# Patient Record
Sex: FEMALE | Race: WHITE | NOT HISPANIC OR LATINO | Employment: STUDENT | URBAN - METROPOLITAN AREA
[De-identification: names, ages, dates, MRNs, and addresses within clinical notes are randomized per-mention and may not be internally consistent; named-entity substitution may affect disease eponyms.]

---
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
## 2025-01-15 ENCOUNTER — LAB (OUTPATIENT)
Dept: LAB | Facility: HOSPITAL | Age: 20
End: 2025-01-15
Payer: COMMERCIAL

## 2025-01-15 ENCOUNTER — OFFICE VISIT (OUTPATIENT)
Dept: GASTROENTEROLOGY | Facility: CLINIC | Age: 20
End: 2025-01-15
Payer: COMMERCIAL

## 2025-01-15 ENCOUNTER — TELEPHONE (OUTPATIENT)
Dept: GASTROENTEROLOGY | Facility: CLINIC | Age: 20
End: 2025-01-15

## 2025-01-15 VITALS
HEART RATE: 94 BPM | WEIGHT: 123 LBS | SYSTOLIC BLOOD PRESSURE: 110 MMHG | BODY MASS INDEX: 22.63 KG/M2 | OXYGEN SATURATION: 100 % | HEIGHT: 62 IN | TEMPERATURE: 98.2 F | DIASTOLIC BLOOD PRESSURE: 70 MMHG

## 2025-01-15 DIAGNOSIS — R14.0 ABDOMINAL BLOATING: ICD-10-CM

## 2025-01-15 DIAGNOSIS — K58.1 IRRITABLE BOWEL SYNDROME WITH CONSTIPATION: ICD-10-CM

## 2025-01-15 DIAGNOSIS — K58.1 IRRITABLE BOWEL SYNDROME WITH CONSTIPATION: Primary | ICD-10-CM

## 2025-01-15 LAB
25(OH)D3 SERPL-MCNC: 26.1 NG/ML (ref 30–100)
ALBUMIN SERPL-MCNC: 4.2 G/DL (ref 3.5–5.2)
ALBUMIN/GLOB SERPL: 1.3 G/DL
ALP SERPL-CCNC: 69 U/L (ref 39–117)
ALT SERPL W P-5'-P-CCNC: 8 U/L (ref 1–33)
ANION GAP SERPL CALCULATED.3IONS-SCNC: 8.2 MMOL/L (ref 5–15)
AST SERPL-CCNC: 19 U/L (ref 1–32)
BASOPHILS # BLD AUTO: 0.03 10*3/MM3 (ref 0–0.2)
BASOPHILS NFR BLD AUTO: 0.7 % (ref 0–1.5)
BILIRUB SERPL-MCNC: 0.4 MG/DL (ref 0–1.2)
BUN SERPL-MCNC: 11 MG/DL (ref 6–20)
BUN/CREAT SERPL: 12.6 (ref 7–25)
CALCIUM SPEC-SCNC: 9.5 MG/DL (ref 8.6–10.5)
CHLORIDE SERPL-SCNC: 105 MMOL/L (ref 98–107)
CO2 SERPL-SCNC: 22.8 MMOL/L (ref 22–29)
CREAT SERPL-MCNC: 0.87 MG/DL (ref 0.57–1)
CRP SERPL-MCNC: <0.3 MG/DL (ref 0–0.5)
DEPRECATED RDW RBC AUTO: 34.3 FL (ref 37–54)
EGFRCR SERPLBLD CKD-EPI 2021: 98 ML/MIN/1.73
EOSINOPHIL # BLD AUTO: 0.04 10*3/MM3 (ref 0–0.4)
EOSINOPHIL NFR BLD AUTO: 0.9 % (ref 0.3–6.2)
ERYTHROCYTE [DISTWIDTH] IN BLOOD BY AUTOMATED COUNT: 11.9 % (ref 12.3–15.4)
GLOBULIN UR ELPH-MCNC: 3.2 GM/DL
GLUCOSE SERPL-MCNC: 87 MG/DL (ref 65–99)
HCT VFR BLD AUTO: 39.7 % (ref 34–46.6)
HGB BLD-MCNC: 13.1 G/DL (ref 12–15.9)
IMM GRANULOCYTES # BLD AUTO: 0.01 10*3/MM3 (ref 0–0.05)
IMM GRANULOCYTES NFR BLD AUTO: 0.2 % (ref 0–0.5)
LYMPHOCYTES # BLD AUTO: 1.8 10*3/MM3 (ref 0.7–3.1)
LYMPHOCYTES NFR BLD AUTO: 39.8 % (ref 19.6–45.3)
MAGNESIUM SERPL-MCNC: 2.1 MG/DL (ref 1.7–2.2)
MCH RBC QN AUTO: 26.4 PG (ref 26.6–33)
MCHC RBC AUTO-ENTMCNC: 33 G/DL (ref 31.5–35.7)
MCV RBC AUTO: 79.9 FL (ref 79–97)
MONOCYTES # BLD AUTO: 0.38 10*3/MM3 (ref 0.1–0.9)
MONOCYTES NFR BLD AUTO: 8.4 % (ref 5–12)
NEUTROPHILS NFR BLD AUTO: 2.26 10*3/MM3 (ref 1.7–7)
NEUTROPHILS NFR BLD AUTO: 50 % (ref 42.7–76)
NRBC BLD AUTO-RTO: 0 /100 WBC (ref 0–0.2)
PLATELET # BLD AUTO: 234 10*3/MM3 (ref 140–450)
PMV BLD AUTO: 11.2 FL (ref 6–12)
POTASSIUM SERPL-SCNC: 4.4 MMOL/L (ref 3.5–5.2)
PROT SERPL-MCNC: 7.4 G/DL (ref 6–8.5)
RBC # BLD AUTO: 4.97 10*6/MM3 (ref 3.77–5.28)
SODIUM SERPL-SCNC: 136 MMOL/L (ref 136–145)
TSH SERPL DL<=0.05 MIU/L-ACNC: 0.95 UIU/ML (ref 0.27–4.2)
WBC NRBC COR # BLD AUTO: 4.52 10*3/MM3 (ref 3.4–10.8)

## 2025-01-15 PROCEDURE — 82784 ASSAY IGA/IGD/IGG/IGM EACH: CPT

## 2025-01-15 PROCEDURE — 99204 OFFICE O/P NEW MOD 45 MIN: CPT | Performed by: PHYSICIAN ASSISTANT

## 2025-01-15 PROCEDURE — 86003 ALLG SPEC IGE CRUDE XTRC EA: CPT

## 2025-01-15 PROCEDURE — 86231 EMA EACH IG CLASS: CPT

## 2025-01-15 PROCEDURE — 86140 C-REACTIVE PROTEIN: CPT

## 2025-01-15 PROCEDURE — 82785 ASSAY OF IGE: CPT

## 2025-01-15 PROCEDURE — 86258 DGP ANTIBODY EACH IG CLASS: CPT

## 2025-01-15 PROCEDURE — 83735 ASSAY OF MAGNESIUM: CPT | Performed by: PHYSICIAN ASSISTANT

## 2025-01-15 PROCEDURE — 86008 ALLG SPEC IGE RECOMB EA: CPT

## 2025-01-15 PROCEDURE — 80050 GENERAL HEALTH PANEL: CPT

## 2025-01-15 PROCEDURE — 82306 VITAMIN D 25 HYDROXY: CPT | Performed by: PHYSICIAN ASSISTANT

## 2025-01-15 PROCEDURE — 86364 TISS TRNSGLTMNASE EA IG CLAS: CPT

## 2025-01-15 PROCEDURE — 36415 COLL VENOUS BLD VENIPUNCTURE: CPT

## 2025-01-15 RX ORDER — NORELGESTROMIN AND ETHINYL ESTRADIOL 35; 150 UG/MG; UG/MG
PATCH TRANSDERMAL
COMMUNITY
Start: 2024-12-06

## 2025-01-15 RX ORDER — PREDNISONE 10 MG/1
TABLET ORAL
COMMUNITY
Start: 2024-09-30 | End: 2025-01-15

## 2025-01-15 RX ORDER — BUSPIRONE HYDROCHLORIDE 5 MG/1
TABLET ORAL
COMMUNITY
Start: 2024-10-21

## 2025-01-15 RX ORDER — POLYETHYLENE GLYCOL 3350 17 G/17G
17 POWDER, FOR SOLUTION ORAL
COMMUNITY
End: 2025-01-15

## 2025-01-15 RX ORDER — ATOGEPANT 60 MG/1
TABLET ORAL
COMMUNITY
Start: 2024-10-30 | End: 2025-01-15

## 2025-01-15 RX ORDER — CLINDAMYCIN PHOSPHATE 11.9 MG/ML
SOLUTION TOPICAL
COMMUNITY
Start: 2024-12-23

## 2025-01-15 RX ORDER — EPINEPHRINE 0.3 MG/.3ML
INJECTION SUBCUTANEOUS
COMMUNITY

## 2025-01-15 NOTE — PROGRESS NOTES
New Patient Consultation     Patient Name: Monet Irvin  : 2005   MRN: [id number]     Chief Complaint:    Chief Complaint   Patient presents with    new patient     bloating    Constipation       History of Present Illness: Monet Irvin is a 19 y.o. female who is here today for a Gastroenterology Consultation for constipation.    Pt notes long term issues with constipation, bloating. She has previously tried and failed magnesium citrate, Miralax, stool softeners, enemas, Trulance, Linzess, bowel preps. She is currently taking Linzess 290mcg sporadically and having a small, incomplete BM about 1-2 times per week. She reports adequate oral hydration, fiber intake. She has eliminated dairy and gluten from her diet in the past with minimal relief.    Pt was previously seen by Dr. Wilber Aranda in [place], data deficient at time of exam.     Patient denies associated fever, chills, indigestion, vomiting, diarrhea, hematemesis, dysphagia, hematochezia, melena, weight loss or gain, dysuria, jaundice or bruising.    Patient denies personal or FHx of PUD, H Pylori, gastritis, pancreatitis, colitis, Celiac disease, UC, Crohn's disease, colon or gastric cancers. Pt denies tobacco, illicit substance or NSAID use. No previous abdominal surgeries. Rare EtOH use.     EGD 2022 with Dr. Angelia Oconnor at . Bx negative for H Pylori, celiac disease, EoE, intestinal metaplasia or dysplasia.     No previous CSY.     Subjective      Review of Systems:   Review of Systems   Constitutional:  Negative for appetite change, chills, diaphoresis, fatigue, fever, unexpected weight gain and unexpected weight loss.   HENT:  Negative for drooling, facial swelling, mouth sores, rhinorrhea, sore throat, tinnitus, trouble swallowing and voice change.    Eyes: Negative.    Respiratory:  Negative for cough, chest tightness and shortness of breath.    Cardiovascular:  Negative for chest pain.   Gastrointestinal:  Positive for  abdominal distention and constipation. Negative for blood in stool, diarrhea, vomiting, GERD and indigestion.   Genitourinary:  Negative for dysuria, flank pain, hematuria and pelvic pain.   Musculoskeletal:  Negative for arthralgias and myalgias.   Skin:  Negative for color change, pallor and rash.   Neurological:  Negative for dizziness, tremors, syncope, weakness and numbness.   Psychiatric/Behavioral:  Negative for hallucinations and sleep disturbance. The patient is not nervous/anxious.    All other systems reviewed and are negative.      Past Medical History: No past medical history on file.    Past Surgical History: No past surgical history on file.    Family History: No family history on file.    Social History:   Social History     Socioeconomic History    Marital status: Single       Alcohol/Tobacco History:   Social History     Substance and Sexual Activity   Alcohol Use Not on file     Social History     Tobacco Use   Smoking Status Not on file   Smokeless Tobacco Not on file       Medications:     Current Outpatient Medications:     busPIRone (BUSPAR) 5 MG tablet, TAKE 1 TABLET 2-3 TIMES DAILY AS NEEDED, Disp: , Rfl:     clindamycin (CLEOCIN T) 1 % external solution, 1(ONE) APPLICATION(S) TOPICAL 2(TWO) TIMES A DAY, Disp: , Rfl:     norelgestromin-ethinyl estradiol (ORTHO EVRA) 150-35 MCG/24HR, Apply 1 patch each week for 3 weeks, then remove for 1 week., Disp: , Rfl:     predniSONE (DELTASONE) 10 MG (21) dose pack, TAKE 6 TABLETS ON DAY 1 AS DIRECTED ON PACKAGE AND DECREASE BY 1 TAB EACH DAY FOR A TOTAL OF 6 DAYS, Disp: , Rfl:     Qulipta 60 MG tablet, , Disp: , Rfl:     diclofenac (VOLTAREN) 75 MG EC tablet, Take 1 tablet by mouth 2 (Two) Times a Day., Disp: 14 tablet, Rfl: 0    ondansetron ODT (ZOFRAN-ODT) 4 MG disintegrating tablet, Place 1 tablet on the tongue Every 6 (Six) Hours As Needed for Nausea or Vomiting., Disp: 15 tablet, Rfl: 0    polyethylene glycol (MIRALAX) 17 GM/SCOOP powder, Take 17  g by mouth., Disp: , Rfl:     Allergies:   No Known Allergies    Objective     Physical Exam:  Vital Signs: There were no vitals filed for this visit.  There is no height or weight on file to calculate BMI.     Physical Exam  Vitals and nursing note reviewed.   Constitutional:       Appearance: Normal appearance. She is normal weight. She is not ill-appearing or diaphoretic.      Comments: BMI 22.50. Pleasantly conversant.    HENT:      Head: Normocephalic and atraumatic.      Right Ear: External ear normal.      Left Ear: External ear normal.      Nose: Nose normal.      Mouth/Throat:      Mouth: Mucous membranes are moist.      Pharynx: Oropharynx is clear.   Eyes:      Conjunctiva/sclera: Conjunctivae normal.      Pupils: Pupils are equal, round, and reactive to light.   Neck:      Thyroid: No thyromegaly.   Cardiovascular:      Rate and Rhythm: Normal rate and regular rhythm.      Pulses: Normal pulses.      Heart sounds: Normal heart sounds.   Pulmonary:      Effort: Pulmonary effort is normal.      Breath sounds: Normal breath sounds.   Abdominal:      General: Abdomen is flat. Bowel sounds are normal. There is distension.      Tenderness: There is no abdominal tenderness.      Comments: Increased stool burden throughout the colon   Musculoskeletal:         General: Normal range of motion.      Cervical back: Normal range of motion and neck supple.      Right lower leg: No edema.      Left lower leg: No edema.   Skin:     General: Skin is warm and dry.   Neurological:      General: No focal deficit present.      Mental Status: She is oriented to person, place, and time.   Psychiatric:         Mood and Affect: Mood normal.         Assessment / Plan      Assessment/Plan:   There are no diagnoses linked to this encounter.     IBS-C  Bloating   - rx for Golytely sent to pharmacy, pt advised to consume the 4L over the course of 3-4 days   - pt afforded samples of IBSrela, advised to call clinic in 1-2 weeks after  completing bowel prep / samples with sx update   - obtain previous records from previous GI provider   - obtain CBC, CMP, CRP, celiac panel, food allergy profile, alpha gal panel, Mg, TSH, vit D, C13 breath test   - schedule for CSY once bowel regimen is better established   - follow up in clinic after completion of above studies   - call clinic at any time for questions or new / worsened sx    Follow Up:   Return for Next scheduled follow up.    Plan of care reviewed with the patient at the conclusion of today's visit.  Education was provided regarding diagnosis, management, and any prescribed or recommended OTC medications.  Patient verbalized understanding of and agreement with management plan.     NOTE TO PATIENT: The 21st Century Cures Act makes medical notes like these available to patients in the interest of transparency. However, be advised this is a medical document. It is intended as peer to peer communication. It is written in medical language and may contain abbreviations or verbiage that are unfamiliar. It may appear blunt or direct. Medical documents are intended to carry relevant information, facts as evident, and the clinical opinion of the practitioner.     Time Statement:   Discussed plan of care in detail with patient today. Patient verbally understands and agrees. I have spent 45 minutes reviewing available diagnostics, obtaining history, examining the patient, developing a treatment plan, and educating the patient on disease process and plan of care.    Floresita Marina PA-C   [facility]

## 2025-01-15 NOTE — TELEPHONE ENCOUNTER
Spoke to Nina.    Requested office notes from Dr Wilber Aranda and Darshan HALE.    She will fax over the records.

## 2025-01-16 ENCOUNTER — TELEPHONE (OUTPATIENT)
Dept: GASTROENTEROLOGY | Facility: CLINIC | Age: 20
End: 2025-01-16
Payer: COMMERCIAL

## 2025-01-16 LAB
ENDOMYSIUM IGA SER QL: NEGATIVE
GLIADIN PEPTIDE IGA SER-ACNC: 3 UNITS (ref 0–19)
GLIADIN PEPTIDE IGG SER-ACNC: 1 UNITS (ref 0–19)
IGA SERPL-MCNC: 150 MG/DL (ref 87–352)
TTG IGA SER-ACNC: <2 U/ML (ref 0–3)
TTG IGG SER-ACNC: <2 U/ML (ref 0–5)

## 2025-01-16 NOTE — TELEPHONE ENCOUNTER
----- Message from Floresita Marina sent at 1/16/2025  8:15 AM EST -----  Please let Monet know that her labs reveal essentially normal CBC, CMP, TSH, Mg, CRP. Her vit D is marginally low. Food allergy profile, alpha gal and celiac panels pending.

## 2025-01-16 NOTE — PROGRESS NOTES
Please let Monet know that her labs reveal essentially normal CBC, CMP, TSH, Mg, CRP. Her vit D is marginally low. Food allergy profile, alpha gal and celiac panels pending.

## 2025-01-18 LAB
CLAM IGE QN: 1.53 KU/L
CODFISH IGE QN: 0.16 KU/L
CONV CLASS DESCRIPTION: ABNORMAL
CORN IGE QN: 3.99 KU/L
COW MILK IGE QN: 0.1 KU/L
EGG WHITE IGE QN: <0.1 KU/L
PEANUT IGE QN: 4.71 KU/L
SCALLOP IGE QN: 2.45 KU/L
SESAME SEED IGE QN: 4.92 KU/L
SHRIMP IGE QN: 2.67 KU/L
SOYBEAN IGE QN: 4.06 KU/L
WALNUT IGE QN: 4 KU/L
WHEAT IGE QN: 4.65 KU/L

## 2025-01-19 LAB
ALPHA-GAL IGE QN: 0.1 KU/L
BEEF IGE QN: 0.14 KU/L
CONV CLASS DESCRIPTION: ABNORMAL
IGE SERPL-ACNC: 153 IU/ML (ref 6–495)
LAMB IGE QN: <0.1 KU/L
PORK IGE QN: 0.12 KU/L

## 2025-01-21 DIAGNOSIS — R14.0 ABDOMINAL BLOATING: ICD-10-CM

## 2025-01-21 DIAGNOSIS — K58.1 IRRITABLE BOWEL SYNDROME WITH CONSTIPATION: ICD-10-CM

## 2025-01-21 DIAGNOSIS — Z91.018 MULTIPLE FOOD ALLERGIES: Primary | ICD-10-CM

## 2025-02-10 DIAGNOSIS — K58.1 IRRITABLE BOWEL SYNDROME WITH CONSTIPATION: Primary | ICD-10-CM

## 2025-02-10 RX ORDER — TENAPANOR HYDROCHLORIDE 53.2 MG/1
50 TABLET ORAL 2 TIMES DAILY
Qty: 60 TABLET | Refills: 11 | Status: SHIPPED | OUTPATIENT
Start: 2025-02-10

## 2025-02-12 ENCOUNTER — PRIOR AUTHORIZATION (OUTPATIENT)
Dept: GASTROENTEROLOGY | Facility: CLINIC | Age: 20
End: 2025-02-12
Payer: COMMERCIAL

## 2025-02-12 NOTE — TELEPHONE ENCOUNTER
Outcome  Additional Information Required    The member benefit does not include pharmacy drug coverage.   Eligible drugs may be covered under the medical benefit.

## 2025-02-14 NOTE — TELEPHONE ENCOUNTER
Your PA request has been approved. Additional information will be provided in the approval communication. (Message 0942)

## 2025-05-12 DIAGNOSIS — R14.0 ABDOMINAL BLOATING: ICD-10-CM

## 2025-05-12 DIAGNOSIS — K58.1 IRRITABLE BOWEL SYNDROME WITH CONSTIPATION: Primary | ICD-10-CM

## 2025-05-12 RX ORDER — POLYETHYLENE GLYCOL 3350 17 G/17G
17 POWDER, FOR SOLUTION ORAL DAILY
Qty: 578 G | Refills: 5 | Status: SHIPPED | OUTPATIENT
Start: 2025-05-12

## 2025-06-03 ENCOUNTER — PRIOR AUTHORIZATION (OUTPATIENT)
Dept: GASTROENTEROLOGY | Facility: CLINIC | Age: 20
End: 2025-06-03
Payer: COMMERCIAL

## 2025-06-17 ENCOUNTER — TELEPHONE (OUTPATIENT)
Dept: GASTROENTEROLOGY | Facility: CLINIC | Age: 20
End: 2025-06-17
Payer: COMMERCIAL

## 2025-06-17 RX ORDER — ONDANSETRON 4 MG/1
4 TABLET, ORALLY DISINTEGRATING ORAL EVERY 6 HOURS PRN
Qty: 30 TABLET | Refills: 2 | Status: SHIPPED | OUTPATIENT
Start: 2025-06-17

## 2025-06-27 ENCOUNTER — OUTSIDE FACILITY SERVICE (OUTPATIENT)
Dept: GASTROENTEROLOGY | Facility: CLINIC | Age: 20
End: 2025-06-27
Payer: COMMERCIAL

## 2025-06-27 PROCEDURE — 43239 EGD BIOPSY SINGLE/MULTIPLE: CPT | Performed by: INTERNAL MEDICINE

## 2025-06-27 PROCEDURE — 45380 COLONOSCOPY AND BIOPSY: CPT | Performed by: INTERNAL MEDICINE

## 2025-06-27 PROCEDURE — 88305 TISSUE EXAM BY PATHOLOGIST: CPT | Performed by: INTERNAL MEDICINE

## 2025-06-30 ENCOUNTER — LAB REQUISITION (OUTPATIENT)
Dept: LAB | Facility: HOSPITAL | Age: 20
End: 2025-06-30
Payer: COMMERCIAL

## 2025-06-30 DIAGNOSIS — K21.9 GASTRO-ESOPHAGEAL REFLUX DISEASE WITHOUT ESOPHAGITIS: ICD-10-CM

## 2025-06-30 DIAGNOSIS — R10.13 EPIGASTRIC PAIN: ICD-10-CM

## 2025-06-30 DIAGNOSIS — R14.0 ABDOMINAL DISTENSION (GASEOUS): ICD-10-CM

## 2025-06-30 DIAGNOSIS — K59.00 CONSTIPATION, UNSPECIFIED: ICD-10-CM

## 2025-06-30 DIAGNOSIS — K21.00 GASTRO-ESOPHAGEAL REFLUX DISEASE WITH ESOPHAGITIS, WITHOUT BLEEDING: ICD-10-CM

## 2025-07-01 ENCOUNTER — RESULTS FOLLOW-UP (OUTPATIENT)
Dept: LAB | Facility: HOSPITAL | Age: 20
End: 2025-07-01
Payer: COMMERCIAL

## 2025-07-01 LAB — REF LAB TEST METHOD: NORMAL

## 2025-07-01 NOTE — PROGRESS NOTES
Per Dr. Martinez:  There were some mild changes of reflux on esophageal biopsies. There was no H. pylori or celiac disease. All of the colon biopsies and terminal ileum were normal.

## 2025-07-01 NOTE — LETTER
Monet Irvin  [address] [place] [place] [address]    2025     Dear Ms. Irvin:    Below are the results from your recent visit:    Resulted Orders   TISSUE EXAM, [facility] ([facility])   Result Value Ref Range    Reference Lab Report       [facility]  [address] [place] [address]  Phone: [phone] or [phone]  Fax: [phone]  Blane Gupta M.D., Medical Director    PATIENT NAME                           LABORATORY NO.  MONET OROPEZA                          [id number]  [id number]                         AGE              SEX  SSN           CLIENT REF #  [facility]           19      2005  F    [id number]   [id number]    [address]              REQUESTING M.D.     ATTENDING [place] [address]                NESTORBARI  DATE COLLECTED      DATE RECEIVED      DATE REPORTED  2025    DIAGNOSIS:  A.   DUODENUM, BIOPSY:  Duodenal type mucosa with no significant histopathologic abnormalities  B.   GASTRIC ANTRUM, BIOPSY:  Gastric mucosa with reactive (chemical) gastropathy  Negative for intestinal metaplasia or dysplasia  No Helicobacter pylori-like organisms seen  C.    STOMACH, BODY, BIOPSY:  Gastric mucosa with reactive (chemical) gastropathy  Negative for intestinal metaplasia or dysplasia  No Helicobacter pylori-like organisms seen  D.   ESOPHAGUS, BIOPSY, DISTAL:  Squamous mucosa with features suggestive of reflux esophagitis (no  eosinophils seen)  Glandular mucosa with mild chronic inflammation  Negative for intestinal metaplasia or dysplasia  Negative for specific microorganisms  E.   TERMINAL ILEUM BIOPSY:  Small intestinal mucosa with no significant histopathologic abnormalities  F.   ASCENDING COLON BIOPSY:  Colonic type mucosa with no significant histopathologic abnormalities  G.   TRANSVERSE COLON BIOPSY:  Colonic type mucosa with no significant  "histopathologic abnormalities  H.   DESCENDING COLON BIOPSY:  Colonic type mucosa with no significant histopathologic abnormalities  I.   RECTAL BIOPSY:  Colonic type mucosa with no significant histopathologic abnormalities    EMMANUELLE    CLINICAL HISTORY:  Abdominal distension (gaseous); Epigastric  pain; Gastro-esophageal reflux  disease with esophagitis, without bleeding; Gastro-esophageal reflux disease  without esophagitis; Constipation, unspecified    SPECIMENS RECEIVED:  A.  DUODENUM, BIOPSY  B.  GASTRIC ANTRUM, BIOPSY  C.  STOMACH, BODY, BIOPSY  D.  ESOPHAGUS, BIOPSY, DISTAL  E.  TERMINAL ILEUM BIOPSY  F.  ASCENDING COLON BIOPSY  G.  TRANSVERSE COLON BIOPSY  H.  DESCENDING COLON BIOPSY  I.  RECTAL BIOPSY    MICROSCOPIC DESCRIPTION:  Tissue blocks are prepared and slides are examined microscopically on all  specimens. See diagnosis for details.    Professional interpretation rendered by Leodan Powell M.D., FAMAN.A.P. at [facility], [address] [place] [address].    GROSS DESCRIPTION:  A.  Labeled \"duodenum\" are multiple portions of tan soft tissue measuring 0.9  x 0.5 x 0.1 cm in aggregate, submitted entirely in 1 block.  IXC  B.  Labeled \"gastric antrum\" are 2 pieces of tan soft tissue measuring 0.8 x 0.3  x 0.1 cm in aggregate, submitted entirely in 1  block.  C.  Labeled \"gastric body\" are 2 pieces of tan soft tissue measuring 0.5 x 0.4 x  0.1 cm in aggregate, submitted entirely in 1 block.  D.  Labeled \"distal esophagus\" are 2 pieces of tan fragmented soft tissue  measuring 0.6 x 0.5 x 0.1 cm in aggregate, submitted entirely in 1 block.  E.  Labeled \"terminal ileum\" are 2 pieces of tan fragmented soft tissue  measuring 0.4 x 0.3 x 0.1 cm in aggregate, submitted entirely in 1 block.  F.  Labeled \"ascending colon\" are 3 pieces of tan soft tissue measuring 0.7 x  0.4 x 0.1 cm in aggregate, submitted entirely in 1 block.  G.  Labeled \"transverse colon\" are 3 pieces of tan soft tissue measuring 0.7 " "x  0.4 x 0.1 cm in aggregate, submitted entirely in 1 block.  H.  Labeled \"descending colon\" are 2 pieces of tan fragmented soft tissue  measuring 0.6 x 0.3 x 0.1 cm in aggregate, submitted entirely in 1 block.  I.  Labeled \"rectum\" are 3 pieces of tan soft tissue measuring 0.5 x 0.5 x 0.1  cm in aggregate, submitted entirely in 1  block.    REVIEWED, DIAGNOSED AND ELECTRONICALLY  SIGNED BY:    Leodan Powell M.D., F.C.A.P.  CPT CODES:  [id number]         Per Dr. Martinez:  There were some mild changes of reflux on esophageal biopsies. There was no H. pylori or celiac disease. All of the colon biopsies and terminal ileum were normal.          TISSUE EXAM, [facility] ([facility])    If you have any questions or concerns, please don't hesitate to call.         Sincerely,        Chapincito Martinez MD      "